# Patient Record
Sex: MALE | Race: WHITE | Employment: UNEMPLOYED | URBAN - METROPOLITAN AREA
[De-identification: names, ages, dates, MRNs, and addresses within clinical notes are randomized per-mention and may not be internally consistent; named-entity substitution may affect disease eponyms.]

---
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
## 2024-05-17 ENCOUNTER — HOSPITAL ENCOUNTER (EMERGENCY)
Facility: HOSPITAL | Age: 19
Discharge: HOME | End: 2024-05-17
Payer: MEDICAID

## 2024-05-17 VITALS
TEMPERATURE: 98.3 F | WEIGHT: 234.5 LBS | DIASTOLIC BLOOD PRESSURE: 85 MMHG | BODY MASS INDEX: 31.8 KG/M2 | OXYGEN SATURATION: 98 % | SYSTOLIC BLOOD PRESSURE: 136 MMHG | RESPIRATION RATE: 16 BRPM | HEART RATE: 92 BPM

## 2024-05-17 DIAGNOSIS — Z59.00 HOMELESS: Primary | ICD-10-CM

## 2024-05-17 PROCEDURE — 99282 EMERGENCY DEPT VISIT SF MDM: CPT

## 2024-05-17 PROCEDURE — 94761 N-INVAS EAR/PLS OXIMETRY MLT: CPT

## 2024-05-17 SDOH — ECONOMIC STABILITY - HOUSING INSECURITY: HOMELESSNESS UNSPECIFIED: Z59.00

## 2024-05-17 ASSESSMENT — PAIN - FUNCTIONAL ASSESSMENT: PAIN_FUNCTIONAL_ASSESSMENT: NONE - DENIES PAIN

## 2024-05-17 NOTE — ED PROVIDER NOTES
EMERGENCY DEPARTMENT HISTORY AND PHYSICAL EXAM      Patient Name: Christopher Branch  MRN: [id number]  YOB: 2005  Provider: Wandy Roger PA-C  PCP: No primary care provider on file.   Time/Date of evaluation: 2:01 PM EDT on 5/17/24    History of Presenting Illness     Chief Complaint   Patient presents with    Mental Health Problem    Homeless       History Provided By: Patient     History (Narative):   Christopher Branch is a 18 y.o. male with no contributory PMHx who presents to the emergency department  by POV C/O of homelessness.  Patient states that he he used to live with his grandfather, but however his grandfather thought he was a nuisance.  Patient currently does not have a job and he states that his father told him to come to the ER and states that he needed mental health evaluation.  However, patient denies any SI HI, auditory or visual hallucinations.  He denies any substance abuse.  He states that he tried to call a hotline for show homeless shelters however was not on able to reach anybody.      Past History     Past Medical History:  No past medical history on file.    Past Surgical History:  No past surgical history on file.    Family History:  No family history on file.    Social History:  Social History     Tobacco Use    Smoking status: Never    Smokeless tobacco: Never   Substance Use Topics    Alcohol use: Never    Drug use: Never       Medications:  No current facility-administered medications for this encounter.     No current outpatient medications on file.       Allergies:  No Known Allergies    Social Determinants of Health:  Social Determinants of Health     Tobacco Use: Low Risk  (7/17/2023)    Patient History     Smoking Tobacco Use: Never     Smokeless Tobacco Use: Never     Passive Exposure: Not on file   Alcohol Use: Not on file   Financial Resource Strain: Not on file   Food Insecurity: Not on file   Transportation Needs: Not on file   Physical Activity: Not on file

## 2024-05-17 NOTE — ED TRIAGE NOTES
Patient comes in saying that his grandfather wants him to be evaluated. States he is here for \"nothing.\" Family states that he is homeless and has nowhere to stay and family wants him to be mentally evaluated.

## 2024-05-17 NOTE — DISCHARGE INSTRUCTIONS
Food Resources  Food Pantries:  [facility]  [address]  [phone]  Tue/Thur 9am-11am  [facility]  [address]  [phone]  Tue/Sat 10am  [facility]  [address] [facility]  [phone]  Thur 10am-330pm  [facility]   [address]  [phone]  Fri 10am-12pm  [facility]   [address] [facility]  [phone]  Tu/Thur 5pm-6pm  [facility]  [address]  [phone]  4th Monday every month 930am-11am  [facility]  [address]  [phone]  Tues/Fri/Sat 7am-8am  [place]:  [facility] [employer] [facility]   Monday-Thursday 7-745am, 1145am-1245pm, And Saturday 12-1pm    Shelters  [facility]  [phone] (DV) Office   [phone] (DV) Hotline   [phone] Homeless Crisis Hotline  POC: is [address]     [phone] [address]  christiane@[employer]  [place] [facility]  Homeless Hotline 037-769-6289  POC: Justo Lemons  , housing assessments  Cell: [phone]  [email or web address] housing homeless  932- 781-1851  POC: Kamico Tracy  [phone]  [email or web address]  [phone]  [facility]  [employer] [facility]  [phone]